# Patient Record
Sex: FEMALE | URBAN - METROPOLITAN AREA
[De-identification: names, ages, dates, MRNs, and addresses within clinical notes are randomized per-mention and may not be internally consistent; named-entity substitution may affect disease eponyms.]

---

## 2022-08-22 ENCOUNTER — OFFICE VISIT (OUTPATIENT)
Dept: URBAN - METROPOLITAN AREA CLINIC 40 | Facility: CLINIC | Age: 67
End: 2022-08-22
Payer: COMMERCIAL

## 2022-08-22 DIAGNOSIS — H25.813 COMBINED FORMS OF AGE-RELATED CATARACT, BILATERAL: Primary | ICD-10-CM

## 2022-08-22 PROCEDURE — 99204 OFFICE O/P NEW MOD 45 MIN: CPT | Performed by: OPTOMETRIST

## 2022-08-22 ASSESSMENT — KERATOMETRY
OS: 45.63
OD: 44.88

## 2022-08-22 ASSESSMENT — VISUAL ACUITY: OS: 20/70

## 2022-08-22 ASSESSMENT — INTRAOCULAR PRESSURE
OD: 13
OS: 13

## 2022-08-22 NOTE — IMPRESSION/PLAN
Impression: Combined forms of age-related cataract, bilateral: H25.813. Plan: Cataracts account for the patient's complaints. Discussed all risks, benefits, procedures and recovery. Patient understands changing glasses will not improve vision. Patient desires to have surgery, recommend phacoemulsification with intraocular lens. Told to bring .

## 2022-09-27 ENCOUNTER — OFFICE VISIT (OUTPATIENT)
Dept: URBAN - METROPOLITAN AREA CLINIC 40 | Facility: CLINIC | Age: 67
End: 2022-09-27
Payer: COMMERCIAL

## 2022-09-27 DIAGNOSIS — H25.813 COMBINED FORMS OF AGE-RELATED CATARACT, BILATERAL: Primary | ICD-10-CM

## 2022-09-27 PROCEDURE — 99204 OFFICE O/P NEW MOD 45 MIN: CPT | Performed by: OPHTHALMOLOGY

## 2022-09-27 ASSESSMENT — INTRAOCULAR PRESSURE
OD: 13
OS: 14

## 2022-09-27 ASSESSMENT — VISUAL ACUITY: OS: 20/70

## 2022-09-27 NOTE — IMPRESSION/PLAN
